# Patient Record
Sex: MALE | Race: ASIAN | NOT HISPANIC OR LATINO | URBAN - METROPOLITAN AREA
[De-identification: names, ages, dates, MRNs, and addresses within clinical notes are randomized per-mention and may not be internally consistent; named-entity substitution may affect disease eponyms.]

---

## 2018-03-08 ENCOUNTER — INPATIENT (INPATIENT)
Facility: HOSPITAL | Age: 53
LOS: 2 days | Discharge: HOME | End: 2018-03-11
Attending: HOSPITALIST | Admitting: HOSPITALIST
Payer: COMMERCIAL

## 2018-03-08 VITALS
TEMPERATURE: 98 F | HEART RATE: 102 BPM | RESPIRATION RATE: 18 BRPM | SYSTOLIC BLOOD PRESSURE: 162 MMHG | DIASTOLIC BLOOD PRESSURE: 95 MMHG | OXYGEN SATURATION: 98 % | WEIGHT: 179.9 LBS

## 2018-03-08 LAB
ALBUMIN SERPL ELPH-MCNC: 4.7 G/DL — SIGNIFICANT CHANGE UP (ref 3.3–5)
ALP SERPL-CCNC: 72 U/L — SIGNIFICANT CHANGE UP (ref 40–120)
ALT FLD-CCNC: 22 U/L — SIGNIFICANT CHANGE UP (ref 4–41)
AST SERPL-CCNC: 26 U/L — SIGNIFICANT CHANGE UP (ref 4–40)
BASOPHILS # BLD AUTO: 0.05 K/UL — SIGNIFICANT CHANGE UP (ref 0–0.2)
BASOPHILS NFR BLD AUTO: 0.6 % — SIGNIFICANT CHANGE UP (ref 0–2)
BILIRUB SERPL-MCNC: 0.5 MG/DL — SIGNIFICANT CHANGE UP (ref 0.2–1.2)
BUN SERPL-MCNC: 20 MG/DL — SIGNIFICANT CHANGE UP (ref 7–23)
CALCIUM SERPL-MCNC: 10.1 MG/DL — SIGNIFICANT CHANGE UP (ref 8.4–10.5)
CHLORIDE SERPL-SCNC: 97 MMOL/L — LOW (ref 98–107)
CK MB BLD-MCNC: 1.69 NG/ML — SIGNIFICANT CHANGE UP (ref 1–6.6)
CK MB BLD-MCNC: SIGNIFICANT CHANGE UP (ref 0–2.5)
CK SERPL-CCNC: 88 U/L — SIGNIFICANT CHANGE UP (ref 30–200)
CO2 SERPL-SCNC: 25 MMOL/L — SIGNIFICANT CHANGE UP (ref 22–31)
CREAT SERPL-MCNC: 1.18 MG/DL — SIGNIFICANT CHANGE UP (ref 0.5–1.3)
EOSINOPHIL # BLD AUTO: 0.12 K/UL — SIGNIFICANT CHANGE UP (ref 0–0.5)
EOSINOPHIL NFR BLD AUTO: 1.4 % — SIGNIFICANT CHANGE UP (ref 0–6)
GLUCOSE SERPL-MCNC: 255 MG/DL — HIGH (ref 70–99)
HCT VFR BLD CALC: 44.3 % — SIGNIFICANT CHANGE UP (ref 39–50)
HGB BLD-MCNC: 15.5 G/DL — SIGNIFICANT CHANGE UP (ref 13–17)
IMM GRANULOCYTES # BLD AUTO: 0.03 # — SIGNIFICANT CHANGE UP
IMM GRANULOCYTES NFR BLD AUTO: 0.3 % — SIGNIFICANT CHANGE UP (ref 0–1.5)
LYMPHOCYTES # BLD AUTO: 1.84 K/UL — SIGNIFICANT CHANGE UP (ref 1–3.3)
LYMPHOCYTES # BLD AUTO: 20.7 % — SIGNIFICANT CHANGE UP (ref 13–44)
MCHC RBC-ENTMCNC: 29.7 PG — SIGNIFICANT CHANGE UP (ref 27–34)
MCHC RBC-ENTMCNC: 35 % — SIGNIFICANT CHANGE UP (ref 32–36)
MCV RBC AUTO: 84.9 FL — SIGNIFICANT CHANGE UP (ref 80–100)
MONOCYTES # BLD AUTO: 0.55 K/UL — SIGNIFICANT CHANGE UP (ref 0–0.9)
MONOCYTES NFR BLD AUTO: 6.2 % — SIGNIFICANT CHANGE UP (ref 2–14)
NEUTROPHILS # BLD AUTO: 6.29 K/UL — SIGNIFICANT CHANGE UP (ref 1.8–7.4)
NEUTROPHILS NFR BLD AUTO: 70.8 % — SIGNIFICANT CHANGE UP (ref 43–77)
NRBC # FLD: 0 — SIGNIFICANT CHANGE UP
PLATELET # BLD AUTO: 247 K/UL — SIGNIFICANT CHANGE UP (ref 150–400)
PMV BLD: 11.1 FL — SIGNIFICANT CHANGE UP (ref 7–13)
POTASSIUM SERPL-MCNC: 4.6 MMOL/L — SIGNIFICANT CHANGE UP (ref 3.5–5.3)
POTASSIUM SERPL-SCNC: 4.6 MMOL/L — SIGNIFICANT CHANGE UP (ref 3.5–5.3)
PROT SERPL-MCNC: 7.6 G/DL — SIGNIFICANT CHANGE UP (ref 6–8.3)
RBC # BLD: 5.22 M/UL — SIGNIFICANT CHANGE UP (ref 4.2–5.8)
RBC # FLD: 12.5 % — SIGNIFICANT CHANGE UP (ref 10.3–14.5)
SODIUM SERPL-SCNC: 138 MMOL/L — SIGNIFICANT CHANGE UP (ref 135–145)
TROPONIN T SERPL-MCNC: < 0.06 NG/ML — SIGNIFICANT CHANGE UP (ref 0–0.06)
WBC # BLD: 8.88 K/UL — SIGNIFICANT CHANGE UP (ref 3.8–10.5)
WBC # FLD AUTO: 8.88 K/UL — SIGNIFICANT CHANGE UP (ref 3.8–10.5)

## 2018-03-08 PROCEDURE — 71046 X-RAY EXAM CHEST 2 VIEWS: CPT | Mod: 26

## 2018-03-08 RX ORDER — ASPIRIN/CALCIUM CARB/MAGNESIUM 324 MG
162 TABLET ORAL ONCE
Qty: 0 | Refills: 0 | Status: COMPLETED | OUTPATIENT
Start: 2018-03-08 | End: 2018-03-08

## 2018-03-08 RX ORDER — ASPIRIN/CALCIUM CARB/MAGNESIUM 324 MG
162 TABLET ORAL ONCE
Qty: 0 | Refills: 0 | Status: DISCONTINUED | OUTPATIENT
Start: 2018-03-08 | End: 2018-03-08

## 2018-03-08 RX ORDER — ASPIRIN/CALCIUM CARB/MAGNESIUM 324 MG
325 TABLET ORAL ONCE
Qty: 0 | Refills: 0 | Status: DISCONTINUED | OUTPATIENT
Start: 2018-03-08 | End: 2018-03-08

## 2018-03-08 RX ORDER — ASPIRIN/CALCIUM CARB/MAGNESIUM 325 MG
192 TABLET ORAL ONCE
Qty: 0 | Refills: 0 | Status: DISCONTINUED | OUTPATIENT
Start: 2018-03-08 | End: 2018-03-08

## 2018-03-08 RX ORDER — ASPIRIN/CALCIUM CARB/MAGNESIUM 324 MG
192 TABLET ORAL ONCE
Qty: 0 | Refills: 0 | Status: DISCONTINUED | OUTPATIENT
Start: 2018-03-08 | End: 2018-03-08

## 2018-03-08 RX ADMIN — Medication 162 MILLIGRAM(S): at 23:39

## 2018-03-08 NOTE — ED ADULT TRIAGE NOTE - CHIEF COMPLAINT QUOTE
Ambulatory with no PMH complaining of intermittent chest pain/cramping that starts on the L side and spreads across to the right chest and BL shoulders. Denies SOB, diaphoresis, N/V/D, travel/long car rides. Comfortable, NAD noted.

## 2018-03-08 NOTE — ED PROVIDER NOTE - ATTENDING CONTRIBUTION TO CARE
I performed a face-to-face evaluation of the patient and performed a history and physical examination. I agree with the history and physical examination.    52 M, DM, resting CP, short-lasting, recurrent, no VTE risks. Appears well. NAD. Afebrile. Vital signs unremarkable. Strong pulse. Respirations unlabored. No LE edema. EKG unremarkable. Low HEART score. Plan: serial trop and EKG. Likely outpatient f/u for stress test.

## 2018-03-08 NOTE — ED PROVIDER NOTE - MEDICAL DECISION MAKING DETAILS
53yo M pmhx DM p/w CC Chest Pain - low heart score - will send for basics, 2 sets troponins, ekg, cxr and likely call PMD (Dr. Rivera) to arrange for f/u stress test if all result come back WNL

## 2018-03-08 NOTE — ED ADULT NURSE NOTE - OBJECTIVE STATEMENT
pt received to room 20. pt is a 52yoM a&ox4 ambulatory at baseline c/o chest pain beginning at 1830 this PM. pt reports pain was 10/10 bilateral cp, took 2 ASA and is now 4/10. pt denies any hx of pain like this. pt reports he was just sitting when pain began. pt reports being tx'ed for flu approx 3 weeks ago. denies sob, diaphoresis,. n/v/d, fever/chills, recent travel/sick contacts. denies cardiac hx. respirations even/unlabored. abdomen soft/nontender/nondistended. skin warm, dry, and intact. 18g iv lock in place to R ac. labs sent. pt nsr on cardiac monitor. will continue to monitor.

## 2018-03-08 NOTE — ED PROVIDER NOTE - OBJECTIVE STATEMENT
51yo M pmhx DM p/w CC chest pain. Pt. states he was sitting down earlier tonight when he started having severe, diffuse, pressure-like chest pain, non-exertional, non-pleuritic. States episodes come and go and last a few minutes each time, currently asymptomatic, never experienced this prior to today. Denies fever chills SOB diaphoresis N/V/D. Non-smoker.

## 2018-03-09 DIAGNOSIS — A41.9 SEPSIS, UNSPECIFIED ORGANISM: ICD-10-CM

## 2018-03-09 LAB
APTT BLD: 26.3 SEC — LOW (ref 27.5–37.4)
CHOLEST SERPL-MCNC: 229 MG/DL — HIGH (ref 120–199)
CK MB BLD-MCNC: 23.32 NG/ML — HIGH (ref 1–6.6)
CK MB BLD-MCNC: 5.7 — HIGH (ref 0–2.5)
CK SERPL-CCNC: 410 U/L — HIGH (ref 30–200)
D DIMER BLD IA.RAPID-MCNC: < 150 NG/ML — SIGNIFICANT CHANGE UP
HBA1C BLD-MCNC: 8.2 % — HIGH (ref 4–5.6)
HDLC SERPL-MCNC: 34 MG/DL — LOW (ref 35–55)
INR BLD: 0.88 — SIGNIFICANT CHANGE UP (ref 0.88–1.17)
LIPID PNL WITH DIRECT LDL SERPL: 111 MG/DL — SIGNIFICANT CHANGE UP
PROTHROM AB SERPL-ACNC: 10.1 SEC — SIGNIFICANT CHANGE UP (ref 9.8–13.1)
TRIGL SERPL-MCNC: 622 MG/DL — HIGH (ref 10–149)
TROPONIN T SERPL-MCNC: 0.24 NG/ML — HIGH (ref 0–0.06)
TROPONIN T SERPL-MCNC: < 0.06 NG/ML — SIGNIFICANT CHANGE UP (ref 0–0.06)

## 2018-03-09 PROCEDURE — 92941 PRQ TRLML REVSC TOT OCCL AMI: CPT | Mod: LD

## 2018-03-09 PROCEDURE — 93010 ELECTROCARDIOGRAM REPORT: CPT

## 2018-03-09 PROCEDURE — 93458 L HRT ARTERY/VENTRICLE ANGIO: CPT | Mod: 26,59

## 2018-03-09 RX ORDER — INSULIN LISPRO 100/ML
VIAL (ML) SUBCUTANEOUS
Qty: 0 | Refills: 0 | Status: DISCONTINUED | OUTPATIENT
Start: 2018-03-09 | End: 2018-03-11

## 2018-03-09 RX ORDER — CLOPIDOGREL BISULFATE 75 MG/1
300 TABLET, FILM COATED ORAL ONCE
Qty: 0 | Refills: 0 | Status: COMPLETED | OUTPATIENT
Start: 2018-03-09 | End: 2018-03-09

## 2018-03-09 RX ORDER — NITROGLYCERIN 6.5 MG
0.4 CAPSULE, EXTENDED RELEASE ORAL ONCE
Qty: 0 | Refills: 0 | Status: COMPLETED | OUTPATIENT
Start: 2018-03-09 | End: 2018-03-09

## 2018-03-09 RX ORDER — TICAGRELOR 90 MG/1
90 TABLET ORAL
Qty: 0 | Refills: 0 | Status: DISCONTINUED | OUTPATIENT
Start: 2018-03-09 | End: 2018-03-11

## 2018-03-09 RX ORDER — DEXTROSE 50 % IN WATER 50 %
25 SYRINGE (ML) INTRAVENOUS ONCE
Qty: 0 | Refills: 0 | Status: DISCONTINUED | OUTPATIENT
Start: 2018-03-09 | End: 2018-03-11

## 2018-03-09 RX ORDER — DEXTROSE 50 % IN WATER 50 %
1 SYRINGE (ML) INTRAVENOUS ONCE
Qty: 0 | Refills: 0 | Status: DISCONTINUED | OUTPATIENT
Start: 2018-03-09 | End: 2018-03-11

## 2018-03-09 RX ORDER — DEXTROSE 50 % IN WATER 50 %
12.5 SYRINGE (ML) INTRAVENOUS ONCE
Qty: 0 | Refills: 0 | Status: DISCONTINUED | OUTPATIENT
Start: 2018-03-09 | End: 2018-03-11

## 2018-03-09 RX ORDER — SODIUM CHLORIDE 9 MG/ML
1000 INJECTION INTRAMUSCULAR; INTRAVENOUS; SUBCUTANEOUS ONCE
Qty: 0 | Refills: 0 | Status: COMPLETED | OUTPATIENT
Start: 2018-03-09 | End: 2018-03-09

## 2018-03-09 RX ORDER — HEPARIN SODIUM 5000 [USP'U]/ML
INJECTION INTRAVENOUS; SUBCUTANEOUS
Qty: 25000 | Refills: 0 | Status: DISCONTINUED | OUTPATIENT
Start: 2018-03-09 | End: 2018-03-09

## 2018-03-09 RX ORDER — GLUCAGON INJECTION, SOLUTION 0.5 MG/.1ML
1 INJECTION, SOLUTION SUBCUTANEOUS ONCE
Qty: 0 | Refills: 0 | Status: DISCONTINUED | OUTPATIENT
Start: 2018-03-09 | End: 2018-03-11

## 2018-03-09 RX ORDER — MORPHINE SULFATE 50 MG/1
4 CAPSULE, EXTENDED RELEASE ORAL ONCE
Qty: 0 | Refills: 0 | Status: DISCONTINUED | OUTPATIENT
Start: 2018-03-09 | End: 2018-03-09

## 2018-03-09 RX ORDER — ATORVASTATIN CALCIUM 80 MG/1
80 TABLET, FILM COATED ORAL AT BEDTIME
Qty: 0 | Refills: 0 | Status: DISCONTINUED | OUTPATIENT
Start: 2018-03-09 | End: 2018-03-11

## 2018-03-09 RX ORDER — ACETAMINOPHEN 500 MG
1000 TABLET ORAL ONCE
Qty: 0 | Refills: 0 | Status: COMPLETED | OUTPATIENT
Start: 2018-03-09 | End: 2018-03-09

## 2018-03-09 RX ORDER — ASPIRIN/CALCIUM CARB/MAGNESIUM 324 MG
81 TABLET ORAL DAILY
Qty: 0 | Refills: 0 | Status: DISCONTINUED | OUTPATIENT
Start: 2018-03-10 | End: 2018-03-11

## 2018-03-09 RX ORDER — HEPARIN SODIUM 5000 [USP'U]/ML
5000 INJECTION INTRAVENOUS; SUBCUTANEOUS ONCE
Qty: 0 | Refills: 0 | Status: COMPLETED | OUTPATIENT
Start: 2018-03-09 | End: 2018-03-09

## 2018-03-09 RX ORDER — HEPARIN SODIUM 5000 [USP'U]/ML
5000 INJECTION INTRAVENOUS; SUBCUTANEOUS EVERY 6 HOURS
Qty: 0 | Refills: 0 | Status: DISCONTINUED | OUTPATIENT
Start: 2018-03-09 | End: 2018-03-09

## 2018-03-09 RX ORDER — INFLUENZA VIRUS VACCINE 15; 15; 15; 15 UG/.5ML; UG/.5ML; UG/.5ML; UG/.5ML
0.5 SUSPENSION INTRAMUSCULAR ONCE
Qty: 0 | Refills: 0 | Status: DISCONTINUED | OUTPATIENT
Start: 2018-03-09 | End: 2018-03-11

## 2018-03-09 RX ORDER — NITROGLYCERIN 6.5 MG
0.4 CAPSULE, EXTENDED RELEASE ORAL
Qty: 0 | Refills: 0 | Status: DISCONTINUED | OUTPATIENT
Start: 2018-03-09 | End: 2018-03-11

## 2018-03-09 RX ORDER — SODIUM CHLORIDE 9 MG/ML
1000 INJECTION, SOLUTION INTRAVENOUS
Qty: 0 | Refills: 0 | Status: DISCONTINUED | OUTPATIENT
Start: 2018-03-09 | End: 2018-03-11

## 2018-03-09 RX ADMIN — CLOPIDOGREL BISULFATE 300 MILLIGRAM(S): 75 TABLET, FILM COATED ORAL at 07:04

## 2018-03-09 RX ADMIN — MORPHINE SULFATE 4 MILLIGRAM(S): 50 CAPSULE, EXTENDED RELEASE ORAL at 06:00

## 2018-03-09 RX ADMIN — HEPARIN SODIUM 5000 UNIT(S): 5000 INJECTION INTRAVENOUS; SUBCUTANEOUS at 07:04

## 2018-03-09 RX ADMIN — Medication 400 MILLIGRAM(S): at 02:47

## 2018-03-09 RX ADMIN — HEPARIN SODIUM 1000 UNIT(S)/HR: 5000 INJECTION INTRAVENOUS; SUBCUTANEOUS at 07:23

## 2018-03-09 RX ADMIN — MORPHINE SULFATE 4 MILLIGRAM(S): 50 CAPSULE, EXTENDED RELEASE ORAL at 05:24

## 2018-03-09 RX ADMIN — Medication 0.4 MILLIGRAM(S): at 06:42

## 2018-03-09 RX ADMIN — Medication 1000 MILLIGRAM(S): at 03:25

## 2018-03-09 RX ADMIN — Medication 30 MILLILITER(S): at 02:47

## 2018-03-09 RX ADMIN — Medication 0.4 MILLIGRAM(S): at 00:45

## 2018-03-09 RX ADMIN — Medication 2: at 18:04

## 2018-03-09 RX ADMIN — TICAGRELOR 90 MILLIGRAM(S): 90 TABLET ORAL at 18:05

## 2018-03-09 RX ADMIN — ATORVASTATIN CALCIUM 80 MILLIGRAM(S): 80 TABLET, FILM COATED ORAL at 21:39

## 2018-03-09 RX ADMIN — Medication 2: at 10:23

## 2018-03-09 RX ADMIN — SODIUM CHLORIDE 1000 MILLILITER(S): 9 INJECTION INTRAMUSCULAR; INTRAVENOUS; SUBCUTANEOUS at 07:00

## 2018-03-09 NOTE — ED CDU PROVIDER INITIAL DAY NOTE - FAMILY HISTORY
Father  Still living? No  Family history of diabetes mellitus, Age at diagnosis: Age Unknown  Family history of heart attack, Age at diagnosis: Age Unknown     Mother  Still living? Unknown  Family history of diabetes mellitus, Age at diagnosis: Age Unknown     Sibling  Still living? No  Family history of heart attack, Age at diagnosis: Age Unknown     Aunt  Still living? No  Family history of heart attack, Age at diagnosis: Age Unknown     Aunt  Still living? No  Family history of heart attack, Age at diagnosis: Age Unknown     Aunt  Still living? No  Family history of heart attack, Age at diagnosis: Age Unknown     Uncle  Still living? No  Family history of heart attack, Age at diagnosis: Age Unknown

## 2018-03-09 NOTE — ED CDU PROVIDER DISPOSITION NOTE - CLINICAL COURSE
pt sent to ED for r/o acs. trops neg but persistent chest pain relieved by nitro. Evaluated by cardiology who recommended cath.

## 2018-03-09 NOTE — ED CDU PROVIDER INITIAL DAY NOTE - OBJECTIVE STATEMENT
51yo M pmhx DM p/w CC chest pain. Pt. states he was sitting down earlier tonight when he started having severe, diffuse, pressure-like chest pain, non-exertional, non-pleuritic. States episodes come and go and last a few minutes each time, currently asymptomatic, never experienced this prior to today. Denies fever chills SOB diaphoresis N/V/D. Non-smoker.  CDU ANAY Mcconnell Note------  51 yo male with PMH of DM, presented to the ED for chest pain; eval'd in ED; trop neg x 2 (2.75 hrs apart); pt continued to have chest pain, so ED team sent pt to CDU for cardiac telemetry monitoring, CE per orders, stress test, observation and reassessment.  On CDU arrival, pt c/o 4/10 central sternal chest pain; non-radiating.  Pt. states pain initially had started with no trigger at rest, states pain was initially sharp and diffuse across chest, radiating to both shoulders and partially down (points) left humeral area (no radiation to back), associated with diaphoresis.  Pt. states pain waxed/waned and finally eased, but as sharper pain eased, was replaced by dull heavy discomfort to central sternal region which has persisted and been constant since.  In ED, pt states pain persisted at 5/10 level; states 4/10 at CDU arrival; pt was given morphine on CDU arrival.    Pt. relates family hx significant for SIX family members with fatal MI's (sister ( at age 51), father ( at age 83), three paternal aunts, and one paternal uncle).  Pt. denies substance abuse hx.  Pt had recent "flu" illness 2 weeks ago.

## 2018-03-09 NOTE — CONSULT NOTE ADULT - SUBJECTIVE AND OBJECTIVE BOX
Date of Admission: 3/9/ 2018    CHIEF COMPLAINT: chest dullness    HISTORY OF PRESENT ILLNESS: 52 [place] citizen male with h/o DM2 and strong FHX for CAD presents with intermittent chest dullness. Patient states that after dinner last night he started to notice L shoulder pain radiating across his chest as a dullness.  Episodes were intermittent and lasted about 5 min. At that time not associated with SOB, diaphoresis, palpitations, or dizziness. After a few hours chest dullness became more persistent with some diaphoresis and patient came to ER. In ER EKG unremarkable and 1st set of cardiac enzymes were negative. Patient was placed in CDU for monitoring with plan for nuclear stress test. Around midnight patient reported severe 10/10 chest dullness with diaphoresis treated with sl NTG and morphine and pain was minimized to 2/10, no further diaphoresis. EKG with minimal ST depressions in inferior leads. 2nd troponin also negative. Dullness persisted and cardiology called for evaluation. DAPT loaded and Heparin gtt started. Nuclear stress test canceled. EKGs and case presented to Dr. Dawn Mendoza and plan for urgent cardiac cath.  Of note 3rd set cardiac enzymes now positive with elevated troponin. Patient agreeable to cath, chest dullness is further diminished, and patient transported to cath lab.      Allergies    No Known Allergies    Intolerances    	    MEDICATIONS:  nitroglycerin     SubLingual 0.4 milliGRAM(s) SubLingual every 5 minutes PRN      dextrose 50% Injectable 12.5 Gram(s) IV Push once  dextrose 50% Injectable 25 Gram(s) IV Push once  dextrose 50% Injectable 25 Gram(s) IV Push once  dextrose Gel 1 Dose(s) Oral once PRN  glucagon  Injectable 1 milliGRAM(s) IntraMuscular once PRN  insulin lispro (HumaLOG) corrective regimen sliding scale   SubCutaneous three times a day before meals    dextrose 5%. 1000 milliLiter(s) IV Continuous <Continuous>      PAST MEDICAL & SURGICAL HISTORY:  DM (diabetes mellitus)  No significant past surgical history      FAMILY HISTORY:  Family history of heart attack (Father, Mother): father  82yo MI  sister  54yo MI      SOCIAL HISTORY:    Smoker    Alcohol  Drugs      REVIEW OF SYSTEMS:  See HPI. Otherwise, 10 point ROS done and otherwise negative.    PHYSICAL EXAM:  T(C): 36.7 (18 @ 06:40), Max: 36.8 (18 @ 21:18)  HR: 84 (18 @ 06:50) (69 - 102)  BP: 109/71 (18 @ 06:50) (109/71 - 189/102)  RR: 18 (18 @ 06:50) (16 - 20)  SpO2: 97% (18 @ 06:50) (97% - 100%)  Wt(kg): --  I&O's Summary      Appearance: Normal	  HEENT:   Normal oral mucosa, PERRL, EOMI	  Cardiovascular: nl S1S2, no M/R/C  Respiratory: CTA B/L	  Psychiatry: A & O x 3, Mood & affect appropriate  Gastrointestinal:  Soft, Non-tender, + BS	  Skin: No rashes, No ecchymoses, No cyanosis	  Neurologic: Non-focal  Extremities: Normal range of motion, No clubbing, cyanosis or edema    LABS:	 	                        15.5   8.88  )-----------( 247      ( 08 Mar 2018 23:00 )             44.3       03-08    138  |  97<L>  |  20  ----------------------------<  255<H>  4.6   |  25  |  1.18    Ca    10.1      08 Mar 2018 23:00    TPro  7.6  /  Alb  4.7  /  TBili  0.5  /  DBili  x   /  AST  26  /  ALT  22  /  AlkPhos  72  03-08      proBNP:   Lipid Profile:   Lipid Profile (18 @ 07:00)    Cholesterol, Serum: 229 mg/dL    Triglycerides, Serum: 622 mg/dL    HDL Cholesterol, Serum: 34 mg/dL    Direct LDL: 111:   RESULT (mg/dL)   (For adults 18 years and older)  ----------------------------------------------------------  Below 70         Ideal for people at very high risk of  heart disease  Below 100        Ideal for people at risk of heart disease  100 - 129        Near [place]  130 - 159        Borderline high  160 - 189        High  190 and above    Very high mg/dL      HgA1c:  Hemoglobin A1C, Whole Blood (03.08.18 @ 04:16)    Hemoglobin A1C, Whole Blood: 8.2: High Risk (prediabetic)    5.7 - 6.4 %  Diabetic, diagnostic           > 6.5 %  ADA diabetic treatment goal    < 7.0 %    HbA1C values may not accurately reflect mean blood glucose  in patients with Hb variants.  Suggest clinical correlation. %       CARDIAC MARKERS:  Troponin T, Serum: < 0.06 ng/mL (18 @ 01:43)  Troponin T, Serum: < 0.06 ng/mL (18 @ 23:00)    CK Total and CKMB (18 @ 23:00)    Creatine Kinase, Serum: 88: CKMB is no longer reflexively performed on elevated CK  results.  To get CKMB results please order "CK AND CKMB".  Effective Preethi 15, 2016. u/L    CKMB: 1.69 ng/mL    CKMB Relative Index: Test not performed    CK Total and CKMB (18 @ 07:00)    Creatine Kinase, Serum: 410: CKMB is no longer reflexively performed on elevated CK  results.  To get CKMB results please order "CK AND CKMB".  Effective Preethi 15, 2016. u/L        CKMB: 1.69 ng/mL ( @ 23:00)    CKMB Relative Index: Test not performed ( @ 23:00)      TELEMETRY: NSR occ VPC  	    EC. NSR NSST TW changes noted. 2. NSR with minimal STD noted in inferior Date of Admission: 3/9/ 2018    CHIEF COMPLAINT: chest dullness    HISTORY OF PRESENT ILLNESS: 52 [place] male with h/o DM2 and strong FHX for CAD presents with intermittent chest dullness. Patient states that after dinner last night he started to notice L shoulder pain radiating across his chest as a dullness.  Episodes were intermittent and lasted about 5 min. At that time not associated with SOB, diaphoresis, palpitations, or dizziness. After a few hours chest dullness became more persistent with some diaphoresis and patient came to ER. In ER EKG unremarkable and 1st set of cardiac enzymes were negative. Patient was placed in CDU for monitoring with plan for nuclear stress test. Around midnight patient reported severe 10/10 chest dullness with diaphoresis treated with sl NTG and morphine and pain was minimized to 2/10, no further diaphoresis. EKG with minimal ST depressions in inferior leads. 2nd troponin also negative. Dullness persisted and cardiology called for evaluation. DAPT loaded and Heparin gtt started. Nuclear stress test canceled. EKGs and case presented to Dr. Dawn Mendoza and plan for urgent cardiac cath.  Of note 3rd set cardiac enzymes now positive with elevated troponin. Patient agreeable to cath, chest dullness is further diminished, and patient transported to cath lab.     906 Addendum: patient reports being treated for flu approximately 3 weeks ago (tamiflu and antibiotics). No additional symptoms since then.     Allergies    No Known Allergies    Intolerances    	    MEDICATIONS:  nitroglycerin     SubLingual 0.4 milliGRAM(s) SubLingual every 5 minutes PRN      dextrose 50% Injectable 12.5 Gram(s) IV Push once  dextrose 50% Injectable 25 Gram(s) IV Push once  dextrose 50% Injectable 25 Gram(s) IV Push once  dextrose Gel 1 Dose(s) Oral once PRN  glucagon  Injectable 1 milliGRAM(s) IntraMuscular once PRN  insulin lispro (HumaLOG) corrective regimen sliding scale   SubCutaneous three times a day before meals    dextrose 5%. 1000 milliLiter(s) IV Continuous <Continuous>      PAST MEDICAL & SURGICAL HISTORY:  DM (diabetes mellitus)  No significant past surgical history      FAMILY HISTORY:  Family history of heart attack (Father, Mother): father  84yo MI  sister  54yo MI      SOCIAL HISTORY:    Smoker    Alcohol  Drugs      REVIEW OF SYSTEMS:  See HPI. Otherwise, 10 point ROS done and otherwise negative.    PHYSICAL EXAM:  T(C): 36.7 (18 @ 06:40), Max: 36.8 (18 @ 21:18)  HR: 84 (18 @ 06:50) (69 - 102)  BP: 109/71 (18 @ 06:50) (109/71 - 189/102)  RR: 18 (18 @ 06:50) (16 - 20)  SpO2: 97% (18 @ 06:50) (97% - 100%)  Wt(kg): --  I&O's Summary      Appearance: Normal	  HEENT:   Normal oral mucosa, PERRL, EOMI	  Cardiovascular: nl S1S2, no M/R/C  Respiratory: CTA B/L	  Psychiatry: A & O x 3, Mood & affect appropriate  Gastrointestinal:  Soft, Non-tender, + BS	  Skin: No rashes, No ecchymoses, No cyanosis	  Neurologic: Non-focal  Extremities: Normal range of motion, No clubbing, cyanosis or edema    LABS:	 	                        15.5   8.88  )-----------( 247      ( 08 Mar 2018 23:00 )             44.3       0308    138  |  97<L>  |  20  ----------------------------<  255<H>  4.6   |  25  |  1.18    Ca    10.1      08 Mar 2018 23:00    TPro  7.6  /  Alb  4.7  /  TBili  0.5  /  DBili  x   /  AST  26  /  ALT  22  /  AlkPhos  72  0308      proBNP:   Lipid Profile:   Lipid Profile (18 @ 07:00)    Cholesterol, Serum: 229 mg/dL    Triglycerides, Serum: 622 mg/dL    HDL Cholesterol, Serum: 34 mg/dL    Direct LDL: 111:   RESULT (mg/dL)   (For adults 18 years and older)  ----------------------------------------------------------  Below 70         Ideal for people at very high risk of  heart disease  Below 100        Ideal for people at risk of heart disease  100 - 129        [place]  130 - 159        Borderline high  160 - 189        High  190 and above    Very high mg/dL      HgA1c:  Hemoglobin A1C, Whole Blood (18 @ 04:16)    Hemoglobin A1C, Whole Blood: 8.2: High Risk (prediabetic)    5.7 - 6.4 %  Diabetic, diagnostic           > 6.5 %  ADA diabetic treatment goal    < 7.0 %    HbA1C values may not accurately reflect mean blood glucose  in patients with Hb variants.  Suggest clinical correlation. %       CARDIAC MARKERS:  Troponin T, Serum: < 0.06 ng/mL (18 @ 01:43)  Troponin T, Serum: < 0.06 ng/mL (18 @ 23:00)    CK Total and CKMB (18 @ 23:00)    Creatine Kinase, Serum: 88: CKMB is no longer reflexively performed on elevated CK  results.  To get CKMB results please order "CK AND CKMB".  Effective Preethi 15, 2016. u/L    CKMB: 1.69 ng/mL    CKMB Relative Index: Test not performed    CK Total and CKMB (18 @ 07:00)    Creatine Kinase, Serum: 410: CKMB is no longer reflexively performed on elevated CK  results.  To get CKMB results please order "CK AND CKMB".  Effective Preethi 15, 2016. u/L        CKMB: 1.69 ng/mL ( @ 23:00)    CKMB Relative Index: Test not performed ( @ 23:00)      TELEMETRY: NSR occ VPC  	    EC. NSR NSST TW changes noted. 2. NSR with minimal STD noted in inferior

## 2018-03-09 NOTE — ED CDU PROVIDER INITIAL DAY NOTE - DETAILS
Initial CDU plan: Cardiac telemetry monitoring, CE per orders, stress test, observation and reassessment.  Plan changed to cardiac tele monitoring, CE per orders/d-dimer/echo, stress test cancelled (pt having persisting pain), will need likely cath, meds ordered (nitro, plavix load, heparin); cardiology cath consult called.

## 2018-03-09 NOTE — H&P CARDIOLOGY - HISTORY OF PRESENT ILLNESS
53 year old male with family history of CAD with DM-II who presented to [facility] ED last night complaining of chest pain that occured after eating dinner. Patient persisted and increasing in intensity and prompted him to present to the ED. Admits to first episode of chest pain. Denies SOB, dizziness, syncope, edema, orthopnea, PND, increase in fatigue and decrease in exercise tolerance.  Patient R/O MI with EKg changes supicious for ischemia and given Plavix 300mg po x1 and ASA 162mg po x1 along with heparin drip.   In light of patients cardiac risk factors, symptoms and abnormal noninvasive test findings there is high suspicion for CAD. Patient is now referred to [facility] for a cardiac catheterization with possible PTCA/stent.

## 2018-03-09 NOTE — ED CDU PROVIDER INITIAL DAY NOTE - INDICATION FOR OBSERVATION
Initial CDU plan: Cardiac telemetry monitoring, CE per orders, stress test, observation and reassessment./Diagnostic Uncertainty

## 2018-03-09 NOTE — ED ADULT NURSE REASSESSMENT NOTE - NS ED NURSE REASSESS COMMENT FT1
pt began c/o severe 10/10 midsternal cp. repeat EKG done. BPs checked both arms: L: 192/102 R: 189/102. Pt diaphoretic. MD made aware. MD at bedside for eval. pt NSR on cardiac monitor. will reassess.

## 2018-03-09 NOTE — CHART NOTE - NSCHARTNOTEFT_GEN_A_CORE
Right wrist with guaze and tape over access site from cath. No swelling or bleeding, capillary refill < 2sec.

## 2018-03-09 NOTE — CONSULT NOTE ADULT - ASSESSMENT
52M with DM and strong FHX for CAD presents with chest dullness, now with elevated cardiac enzymes and concern for unstable angina vs. NSTEMI.    Case and EKGs presented to Dr. Dawn Mendoza: DAPT load with Heparin gtt and plan for urgent cath.

## 2018-03-09 NOTE — ED CDU PROVIDER INITIAL DAY NOTE - PROGRESS NOTE DETAILS
On my arrival in CDU, pt was being evaluated. by cardiology for cath. he appeared stable. was taken by cardiology to cath. On CDU arrival, pt having what he describes as constant central sternal chest pain.  Pt rec'd morphine on CDU arrival; did not improve 4/10 pain.  EKG initially performed in ED 3/8 at 21:32 hrs showed NSR with ST segment depression in lead III; EKG performed in ED 3/9 @ 00:23 hrs showed deeper ST depressions in lead III and new ST depressions in lead AVF; troponin x 2 negative (trops were approx. 2.75 hrs apart).  NTG dose given; improved pain from 4/10 to 2/10; additional NTG dose given, pain unchanged at 2/10; BP dropped to 90/54; NS 1 liter given which helped BP; pt objectively appeared comfortable throughout.  EKG performed in ED 3/8 at 21:32 hrs showed NSR with ST segment depression in lead III; EKG from 3/9 @ 00:23 hrs showed deeper ST depressions in lead III and new ST depressions in lead AVF; troponin x 2 negative (trops were approx. 2.75 hrs apart).  After NTG dose given in CDU, repeat EKG performed which showed slight improvement of ST depressions from prior EKG.  C/D with stress PA at 06:17 hrs who came to eval pt as well; mutually decided to cancel stress test.  Cardiology contacted (spoke to covering NP) at 0645 hrs for cath consult; promptly came to eval pt.  Pt. was signed over to day CDU PA and MD at 0715 hrs; cards NP discussed with fellow and advised admit to Dr. Dawn Mendoza; day team in process of admitting pt who is going to go for prompt cath.  No other issues in interim; pt stable.

## 2018-03-09 NOTE — ED CDU PROVIDER INITIAL DAY NOTE - MEDICAL DECISION MAKING DETAILS
Cardiac telemetry monitoring, CE per orders, stress test, observation and reassessment. Initial CDU plan: Cardiac telemetry monitoring, CE per orders, stress test, observation and reassessment.  Plan changed to cardiac tele monitoring, CE per orders/d-dimer/echo, stress test cancelled (pt having persisting pain), will need likely cath, meds ordered (nitro, plavix load, heparin); cardiology cath consult called.

## 2018-03-09 NOTE — H&P CARDIOLOGY - FAMILY HISTORY
Father  Still living? No  Family history of heart attack, Age at diagnosis: Age Unknown     Mother  Still living? Unknown  Family history of heart attack, Age at diagnosis: Age Unknown

## 2018-03-09 NOTE — ED CDU PROVIDER INITIAL DAY NOTE - ATTENDING CONTRIBUTION TO CARE
DR. PINA, ATTENDING MD-  I performed a face to face bedside interview with patient regarding history of present illness, review of symptoms and past medical history. I completed an independent physical exam.  I have discussed patient's plan of care with PA.   Documentation as above in the note.

## 2018-03-10 LAB
BUN SERPL-MCNC: 12 MG/DL — SIGNIFICANT CHANGE UP (ref 7–23)
CALCIUM SERPL-MCNC: 9.3 MG/DL — SIGNIFICANT CHANGE UP (ref 8.4–10.5)
CHLORIDE SERPL-SCNC: 97 MMOL/L — LOW (ref 98–107)
CK MB BLD-MCNC: 10.31 NG/ML — HIGH (ref 1–6.6)
CK MB BLD-MCNC: 26.71 NG/ML — HIGH (ref 1–6.6)
CK MB BLD-MCNC: 3.8 — HIGH (ref 0–2.5)
CK MB BLD-MCNC: 6.3 — HIGH (ref 0–2.5)
CK SERPL-CCNC: 271 U/L — HIGH (ref 30–200)
CK SERPL-CCNC: 422 U/L — HIGH (ref 30–200)
CO2 SERPL-SCNC: 24 MMOL/L — SIGNIFICANT CHANGE UP (ref 22–31)
CREAT SERPL-MCNC: 0.94 MG/DL — SIGNIFICANT CHANGE UP (ref 0.5–1.3)
GLUCOSE SERPL-MCNC: 246 MG/DL — HIGH (ref 70–99)
HCT VFR BLD CALC: 45.9 % — SIGNIFICANT CHANGE UP (ref 39–50)
HGB BLD-MCNC: 15.3 G/DL — SIGNIFICANT CHANGE UP (ref 13–17)
MAGNESIUM SERPL-MCNC: 3.3 MG/DL — HIGH (ref 1.6–2.6)
MCHC RBC-ENTMCNC: 29 PG — SIGNIFICANT CHANGE UP (ref 27–34)
MCHC RBC-ENTMCNC: 33.3 % — SIGNIFICANT CHANGE UP (ref 32–36)
MCV RBC AUTO: 86.9 FL — SIGNIFICANT CHANGE UP (ref 80–100)
NRBC # FLD: 0 — SIGNIFICANT CHANGE UP
PLATELET # BLD AUTO: 220 K/UL — SIGNIFICANT CHANGE UP (ref 150–400)
PMV BLD: 11 FL — SIGNIFICANT CHANGE UP (ref 7–13)
POTASSIUM SERPL-MCNC: 4.2 MMOL/L — SIGNIFICANT CHANGE UP (ref 3.5–5.3)
POTASSIUM SERPL-SCNC: 4.2 MMOL/L — SIGNIFICANT CHANGE UP (ref 3.5–5.3)
RBC # BLD: 5.28 M/UL — SIGNIFICANT CHANGE UP (ref 4.2–5.8)
RBC # FLD: 12.7 % — SIGNIFICANT CHANGE UP (ref 10.3–14.5)
SODIUM SERPL-SCNC: 136 MMOL/L — SIGNIFICANT CHANGE UP (ref 135–145)
TROPONIN T SERPL-MCNC: 1.76 NG/ML — HIGH (ref 0–0.06)
TROPONIN T SERPL-MCNC: 2.04 NG/ML — HIGH (ref 0–0.06)
WBC # BLD: 9.99 K/UL — SIGNIFICANT CHANGE UP (ref 3.8–10.5)
WBC # FLD AUTO: 9.99 K/UL — SIGNIFICANT CHANGE UP (ref 3.8–10.5)

## 2018-03-10 RX ORDER — METOPROLOL TARTRATE 50 MG
25 TABLET ORAL
Qty: 0 | Refills: 0 | Status: DISCONTINUED | OUTPATIENT
Start: 2018-03-10 | End: 2018-03-11

## 2018-03-10 RX ORDER — HEPARIN SODIUM 5000 [USP'U]/ML
5000 INJECTION INTRAVENOUS; SUBCUTANEOUS EVERY 8 HOURS
Qty: 0 | Refills: 0 | Status: DISCONTINUED | OUTPATIENT
Start: 2018-03-10 | End: 2018-03-11

## 2018-03-10 RX ADMIN — Medication 81 MILLIGRAM(S): at 11:58

## 2018-03-10 RX ADMIN — Medication 2: at 08:58

## 2018-03-10 RX ADMIN — HEPARIN SODIUM 5000 UNIT(S): 5000 INJECTION INTRAVENOUS; SUBCUTANEOUS at 22:03

## 2018-03-10 RX ADMIN — Medication 25 MILLIGRAM(S): at 14:57

## 2018-03-10 RX ADMIN — Medication 2: at 13:24

## 2018-03-10 RX ADMIN — HEPARIN SODIUM 5000 UNIT(S): 5000 INJECTION INTRAVENOUS; SUBCUTANEOUS at 14:57

## 2018-03-10 RX ADMIN — TICAGRELOR 90 MILLIGRAM(S): 90 TABLET ORAL at 05:16

## 2018-03-10 RX ADMIN — ATORVASTATIN CALCIUM 80 MILLIGRAM(S): 80 TABLET, FILM COATED ORAL at 22:03

## 2018-03-10 RX ADMIN — Medication 1: at 17:33

## 2018-03-10 RX ADMIN — TICAGRELOR 90 MILLIGRAM(S): 90 TABLET ORAL at 17:33

## 2018-03-10 NOTE — PROGRESS NOTE ADULT - ASSESSMENT
52M with DM and strong FHX for CAD presents NSTEMI s/p PCI D1    NSTEMI    - Continue DAPT, statin   - start lopressor 25mg BID  - Add ACE-i in the AM  - Trend CE   - TTE in the AM and d/c home

## 2018-03-10 NOTE — PROGRESS NOTE ADULT - ATTENDING COMMENTS
d/w LOU Hughes. Agree with above. Plan for d/c tomorrow, pending echo and resolution of tachycardia.

## 2018-03-10 NOTE — PROGRESS NOTE ADULT - SUBJECTIVE AND OBJECTIVE BOX
Tele: NSR     Overnight: No compliants    MEDICATIONS  (STANDING):  aspirin enteric coated 81 milliGRAM(s) Oral daily  atorvastatin 80 milliGRAM(s) Oral at bedtime  influenza   Vaccine 0.5 milliLiter(s) IntraMuscular once  insulin lispro (HumaLOG) corrective regimen sliding scale   SubCutaneous three times a day before meals  metoprolol     tartrate 25 milliGRAM(s) Oral two times a day  ticagrelor 90 milliGRAM(s) Oral two times a day    MEDICATIONS  (PRN):  dextrose Gel 1 Dose(s) Oral once PRN Blood Glucose LESS THAN 70 milliGRAM(s)/deciliter  glucagon  Injectable 1 milliGRAM(s) IntraMuscular once PRN Glucose LESS THAN 70 milligrams/deciliter  nitroglycerin     SubLingual 0.4 milliGRAM(s) SubLingual every 5 minutes PRN Chest Pain          Vital Signs Last 24 Hrs  T(C): 37.3 (10 Mar 2018 08:30), Max: 37.3 (10 Mar 2018 08:30)  T(F): 99.1 (10 Mar 2018 08:30), Max: 99.1 (10 Mar 2018 08:30)  HR: 130 (10 Mar 2018 13:05) (80 - 132)  BP: 122/86 (10 Mar 2018 08:30) (122/86 - 135/85)  RR: 18 (10 Mar 2018 08:30) (18 - 18)  SpO2: 100% (10 Mar 2018 08:30) (98% - 100%)  I&O's Detail        Physical exam:   Gen- NAD  Resp- Clear   CV- S1S2 RRR  ABD- +BS x 4 ND/NT  EXT- No edema   Neuro- A&Ox3                            15.3   9.99  )-----------( 220      ( 10 Mar 2018 07:10 )             45.9     PT/INR - ( 09 Mar 2018 07:00 )   PT: 10.1 SEC;   INR: 0.88          PTT - ( 09 Mar 2018 07:00 )  PTT:26.3 SEC  10 Mar 2018 07:10    136    |  97<L>  |  12     ----------------------------<  246<H>  4.2     |  24     |  0.94   08 Mar 2018 23:00    138    |  97<L>  |  20     ----------------------------<  255<H>  4.6     |  25     |  1.18     Ca    9.3        10 Mar 2018 07:10  Ca    10.1       08 Mar 2018 23:00  Mg     3.3<H>     10 Mar 2018 07:10    TPro  7.6    /  Alb  4.7    /  TBili  0.5    /  DBili  x      /  AST  26     /  ALT  22     /  AlkPhos  72     08 Mar 2018 23:00    CARDIAC MARKERS     reviewed

## 2018-03-11 ENCOUNTER — TRANSCRIPTION ENCOUNTER (OUTPATIENT)
Age: 53
End: 2018-03-11

## 2018-03-11 VITALS
DIASTOLIC BLOOD PRESSURE: 72 MMHG | TEMPERATURE: 98 F | OXYGEN SATURATION: 96 % | SYSTOLIC BLOOD PRESSURE: 106 MMHG | HEART RATE: 86 BPM | RESPIRATION RATE: 19 BRPM

## 2018-03-11 LAB
BUN SERPL-MCNC: 15 MG/DL — SIGNIFICANT CHANGE UP (ref 7–23)
CALCIUM SERPL-MCNC: 9.3 MG/DL — SIGNIFICANT CHANGE UP (ref 8.4–10.5)
CHLORIDE SERPL-SCNC: 97 MMOL/L — LOW (ref 98–107)
CO2 SERPL-SCNC: 26 MMOL/L — SIGNIFICANT CHANGE UP (ref 22–31)
CREAT SERPL-MCNC: 0.97 MG/DL — SIGNIFICANT CHANGE UP (ref 0.5–1.3)
GLUCOSE SERPL-MCNC: 216 MG/DL — HIGH (ref 70–99)
HCT VFR BLD CALC: 43.7 % — SIGNIFICANT CHANGE UP (ref 39–50)
HGB BLD-MCNC: 15 G/DL — SIGNIFICANT CHANGE UP (ref 13–17)
MCHC RBC-ENTMCNC: 29.5 PG — SIGNIFICANT CHANGE UP (ref 27–34)
MCHC RBC-ENTMCNC: 34.3 % — SIGNIFICANT CHANGE UP (ref 32–36)
MCV RBC AUTO: 86 FL — SIGNIFICANT CHANGE UP (ref 80–100)
NRBC # FLD: 0 — SIGNIFICANT CHANGE UP
PLATELET # BLD AUTO: 214 K/UL — SIGNIFICANT CHANGE UP (ref 150–400)
PMV BLD: 11.3 FL — SIGNIFICANT CHANGE UP (ref 7–13)
POTASSIUM SERPL-MCNC: 3.9 MMOL/L — SIGNIFICANT CHANGE UP (ref 3.5–5.3)
POTASSIUM SERPL-SCNC: 3.9 MMOL/L — SIGNIFICANT CHANGE UP (ref 3.5–5.3)
RBC # BLD: 5.08 M/UL — SIGNIFICANT CHANGE UP (ref 4.2–5.8)
RBC # FLD: 12.5 % — SIGNIFICANT CHANGE UP (ref 10.3–14.5)
SODIUM SERPL-SCNC: 137 MMOL/L — SIGNIFICANT CHANGE UP (ref 135–145)
TROPONIN T SERPL-MCNC: 1.85 NG/ML — HIGH (ref 0–0.06)
WBC # BLD: 8.55 K/UL — SIGNIFICANT CHANGE UP (ref 3.8–10.5)
WBC # FLD AUTO: 8.55 K/UL — SIGNIFICANT CHANGE UP (ref 3.8–10.5)

## 2018-03-11 PROCEDURE — 93306 TTE W/DOPPLER COMPLETE: CPT | Mod: 26

## 2018-03-11 RX ORDER — METOPROLOL TARTRATE 50 MG
1 TABLET ORAL
Qty: 60 | Refills: 0 | OUTPATIENT
Start: 2018-03-11 | End: 2018-04-09

## 2018-03-11 RX ORDER — ATORVASTATIN CALCIUM 80 MG/1
1 TABLET, FILM COATED ORAL
Qty: 30 | Refills: 0 | OUTPATIENT
Start: 2018-03-11 | End: 2018-04-09

## 2018-03-11 RX ORDER — ASPIRIN/CALCIUM CARB/MAGNESIUM 324 MG
1 TABLET ORAL
Qty: 0 | Refills: 0 | COMMUNITY
Start: 2018-03-11

## 2018-03-11 RX ORDER — LISINOPRIL 2.5 MG/1
1 TABLET ORAL
Qty: 30 | Refills: 0 | OUTPATIENT
Start: 2018-03-11 | End: 2018-04-09

## 2018-03-11 RX ORDER — TICAGRELOR 90 MG/1
1 TABLET ORAL
Qty: 180 | Refills: 0 | OUTPATIENT
Start: 2018-03-11 | End: 2018-06-08

## 2018-03-11 RX ORDER — METOPROLOL TARTRATE 50 MG
50 TABLET ORAL DAILY
Qty: 0 | Refills: 0 | Status: DISCONTINUED | OUTPATIENT
Start: 2018-03-11 | End: 2018-03-11

## 2018-03-11 RX ORDER — LISINOPRIL 2.5 MG/1
5 TABLET ORAL DAILY
Qty: 0 | Refills: 0 | Status: DISCONTINUED | OUTPATIENT
Start: 2018-03-11 | End: 2018-03-11

## 2018-03-11 RX ORDER — METFORMIN HYDROCHLORIDE 850 MG/1
1 TABLET ORAL
Qty: 0 | Refills: 0 | COMMUNITY

## 2018-03-11 RX ORDER — METOPROLOL TARTRATE 50 MG
1 TABLET ORAL
Qty: 30 | Refills: 0 | OUTPATIENT
Start: 2018-03-11 | End: 2018-04-09

## 2018-03-11 RX ADMIN — TICAGRELOR 90 MILLIGRAM(S): 90 TABLET ORAL at 17:48

## 2018-03-11 RX ADMIN — Medication 1: at 17:48

## 2018-03-11 RX ADMIN — Medication 25 MILLIGRAM(S): at 05:17

## 2018-03-11 RX ADMIN — Medication 1: at 09:30

## 2018-03-11 RX ADMIN — Medication 81 MILLIGRAM(S): at 11:47

## 2018-03-11 RX ADMIN — TICAGRELOR 90 MILLIGRAM(S): 90 TABLET ORAL at 05:16

## 2018-03-11 RX ADMIN — HEPARIN SODIUM 5000 UNIT(S): 5000 INJECTION INTRAVENOUS; SUBCUTANEOUS at 05:17

## 2018-03-11 RX ADMIN — HEPARIN SODIUM 5000 UNIT(S): 5000 INJECTION INTRAVENOUS; SUBCUTANEOUS at 15:48

## 2018-03-11 RX ADMIN — Medication 1: at 13:30

## 2018-03-11 NOTE — DISCHARGE NOTE ADULT - PATIENT PORTAL LINK FT
You can access the [facility] Patient Portal, offered by [facility], by registering with the following website: [email or web address]

## 2018-03-11 NOTE — DISCHARGE NOTE ADULT - CARE PLAN
Principal Discharge DX:	NSTEMI (non-ST elevated myocardial infarction)  Goal:	To be asymptomatic, to reduce risks factors such as hypertension, diabetes and hyperlipidemia to lower the risk of blood clots formation; and to prevent complications of coronary artery disease such as worsening chest pain, heart attack and death.  Assessment and plan of treatment:	Continue aspirin and Plavix, do not stop unless instructed by your physician.  Continue low salt, fat, cholesterol and carbohydrate diet. Follow up with cardiologist and primary care physician's routine appointment.  Secondary Diagnosis:	CAD (coronary artery disease)  Goal:	To be asymptomatic, to reduce risks factors such as hypertension, diabetes and hyperlipidemia to lower the risk of blood clots formation; and to prevent complications of coronary artery disease such as worsening chest pain, heart attack and death.  Assessment and plan of treatment:	Continue aspirin and Plavix, do not stop unless instructed by your physician.  Continue low salt, fat, cholesterol and carbohydrate diet. Follow up with cardiologist and primary care physician's routine appointment.  Secondary Diagnosis:	DM (diabetes mellitus)  Goal:	To maintain a normal blood glucose level and HgA1C level < 5.7 and to prevent diabetic complications such as uncontrolled diabetes, diabetic coma, blindness, renal failure, and amputations.  Assessment and plan of treatment:	Monitor finger sticks pre-meal and bedtime, low salt, fat and carbohydrate diet, minimize glucose intake.  Exercise daily for at least 30 minutes and weight loss.  Follow up with primary care physician and endocrinologist for routine Hemoglobin A1C checks and management.  Follow up with your ophthalmologist for routine yearly vision exams.

## 2018-03-11 NOTE — PROGRESS NOTE ADULT - SUBJECTIVE AND OBJECTIVE BOX
Darryl Jones M.D.  Pager: [phone]  Off hours and weekends call -  NS: [id number]    [id number]    Overnight events: No acute Overnight Events.     Review Of Systems: Denies SOB/BROOKS/CP/Fevers/Chills/Nausea/Vomiting/Diarrhea    Medications:  aspirin enteric coated 81 milliGRAM(s) Oral daily  atorvastatin 80 milliGRAM(s) Oral at bedtime  dextrose 5%. 1000 milliLiter(s) IV Continuous <Continuous>  dextrose 50% Injectable 12.5 Gram(s) IV Push once  dextrose 50% Injectable 25 Gram(s) IV Push once  dextrose 50% Injectable 25 Gram(s) IV Push once  dextrose Gel 1 Dose(s) Oral once PRN  glucagon  Injectable 1 milliGRAM(s) IntraMuscular once PRN  heparin  Injectable 5000 Unit(s) SubCutaneous every 8 hours  influenza   Vaccine 0.5 milliLiter(s) IntraMuscular once  insulin lispro (HumaLOG) corrective regimen sliding scale   SubCutaneous three times a day before meals  metoprolol     tartrate 25 milliGRAM(s) Oral two times a day  nitroglycerin     SubLingual 0.4 milliGRAM(s) SubLingual every 5 minutes PRN  ticagrelor 90 milliGRAM(s) Oral two times a day    PMH/PSH/FH/SH: Unchanged  Vital Signs Last 24 Hrs  T(C): 37.2 (11 Mar 2018 05:00), Max: 37.3 (10 Mar 2018 14:55)  T(F): 98.9 (11 Mar 2018 05:00), Max: 99.2 (10 Mar 2018 14:55)  HR: 89 (11 Mar 2018 05:00) (89 - 130)  BP: 119/65 (11 Mar 2018 05:00) (113/60 - 129/65)  BP(mean): --  RR: 18 (11 Mar 2018 05:00) (18 - 18)  SpO2: 100% (11 Mar 2018 05:00) (97% - 100%)Daily   I&O's Summary      Physical Exam:  Appearance:  NAD  Eyes: PERRL, EOMI  HENT: NC/AT  Cardiovascular: S1, S2, RRR, No m/r/g appreciated, No edema, no elevation in JVP  Respiratory: Clear to auscultation bilaterally  Gastrointestinal: Soft, Non-tender, Non-distended, BS+  Musculoskeletal:  No clubbing, No joint deformity   Neurologic: Grossly Normal  Lymphatic: No lymphadenopathy  Psychiatry: AAOx3, Mood & affect appropriate  Skin: No rashes, No ecchymoses, No cyanosis    Labs:                        15.0   8.55  )-----------( 214      ( 11 Mar 2018 06:08 )             43.7     03-11    137  |  97<L>  |  15  ----------------------------<  216<H>  3.9   |  26  |  0.97    Ca    9.3      11 Mar 2018 06:08  Mg     3.3     03-10      PTT:   INR:  CARDIAC MARKERS ( 11 Mar 2018 06:08 )  x     / 1.85 ng/mL / x     / x     / x      CARDIAC MARKERS ( 10 Mar 2018 18:05 )  x     / 1.76 ng/mL / 271 u/L / 10.31 ng/mL / x      CARDIAC MARKERS ( 10 Mar 2018 07:10 )  x     / 2.04 ng/mL / 422 u/L / 26.71 ng/mL / x                Echo: Pending    Cath:  < from: Cardiac Cath Lab - Adult (03.09.18 @ 08:38) >  VENTRICLES: EF estimated was 65 %.  CORONARY VESSELS: The coronary circulation is right dominant.  LM:   --  LM: Normal.  LAD:   --  Proximal LAD: There was a diffuse 30 % stenosis at a site with  no prior intervention. The lesion was eccentric. There was BOBBY grade 3  flow through the vessel (brisk flow).  --  D1: There was a diffuse 100 % stenosis at a site with no prior  intervention. The lesion was complex. There was BOBBY grade 0 flow through  the vessel (no flow) and a moderate-sized vascular territory distal to the  lesion. This is a likely culprit for the patient's clinical presentation.  An intervention was performed.  --  D2: Angiography showed minor luminal irregularities with no flow  limiting lesions.  --  D3: Angiography showed mild atherosclerosis with no flow limiting  lesions.  CX:   --  Circumflex: Angiography showed mild atherosclerosis with no flow  limiting lesions.  RCA:   --  RCA: Angiography showed minor luminal irregularities with no  flow limiting lesions.  COMPLICATIONS: No complications occurred during the cath lab visit.  DIAGNOSTIC IMPRESSIONS: Successful PCI to 100% occlusion of D-1 using  2.25mm Andre JORDAN  DIAGNOSTIC RECOMMENDATIONS: DAPT x 12 months  Aggressive risk factor modification  INTERVENTIONAL IMPRESSIONS: Successful PCI to 100% occlusion of D-1 using  2.25mm Andre JORDAN  INTERVENTIONAL RECOMMENDATIONS: DAPT x 12 months  Aggressive risk factor modification  Prepared and signed by  Dawn Mendoza M.D.    < end of copied text >        Interpretation of Telemetry: Telemetry reviewed, NSR, no acute overnight events.

## 2018-03-11 NOTE — DISCHARGE NOTE ADULT - PLAN OF CARE
To be asymptomatic, to reduce risks factors such as hypertension, diabetes and hyperlipidemia to lower the risk of blood clots formation; and to prevent complications of coronary artery disease such as worsening chest pain, heart attack and death. Continue aspirin and Plavix, do not stop unless instructed by your physician.  Continue low salt, fat, cholesterol and carbohydrate diet. Follow up with cardiologist and primary care physician's routine appointment. To maintain a normal blood glucose level and HgA1C level < 5.7 and to prevent diabetic complications such as uncontrolled diabetes, diabetic coma, blindness, renal failure, and amputations. Monitor finger sticks pre-meal and bedtime, low salt, fat and carbohydrate diet, minimize glucose intake.  Exercise daily for at least 30 minutes and weight loss.  Follow up with primary care physician and endocrinologist for routine Hemoglobin A1C checks and management.  Follow up with your ophthalmologist for routine yearly vision exams.

## 2018-03-11 NOTE — DISCHARGE NOTE ADULT - MEDICATION SUMMARY - MEDICATIONS TO TAKE
I will START or STAY ON the medications listed below when I get home from the hospital:    aspirin 81 mg oral delayed release tablet  -- 1 tab(s) by mouth once a day  -- Indication: For  MI/CAD    lisinopril 5 mg oral tablet  -- 1 tab(s) by mouth once a day  -- Indication: For CAD    metFORMIN 500 mg oral tablet  -- 1 tab(s) by mouth 2 times a day ( Do Not Restart until 3/13  -- Indication: For DM (diabetes mellitus)    atorvastatin 80 mg oral tablet  -- 1 tab(s) by mouth once a day (at bedtime)  -- Indication: For Cholesterol    ticagrelor 90 mg oral tablet  -- 1 tab(s) by mouth 2 times a day  -- Indication: For CAD    metoprolol succinate 50 mg oral tablet, extended release  -- 1 tab(s) by mouth once a day  -- Indication: For CAD

## 2018-03-11 NOTE — DISCHARGE NOTE ADULT - HOSPITAL COURSE
52 year old male with family history of CAD with DM-II who presented to [facility] ED last night complaining of chest pain. R/I NSTEMI with 3rd troponin positive s/p cath with stent to shae   Case discussed with attending, Pt is stable for Discharge Home.

## 2018-03-11 NOTE — DISCHARGE NOTE ADULT - CARE PROVIDER_API CALL
Dawn Mendoza ([facility]), Cardiology; Internal Medicine  [id number] [place] [address]  [address]  [place], [place] [id number]  Phone: [phone]  Fax: [phone]

## 2018-03-11 NOTE — PROGRESS NOTE ADULT - ASSESSMENT
52M PMHx DM2 a/w NSTEMI now s/p PCI to D1    - ASA/Ticagrelor for 12 months  - Change metoprolol tartrate to Toprol Xl 50mg QD  - c/w statin  - add lisinopril 5mg QD  - d/c planning with close cardiology follow up.

## 2018-03-28 PROBLEM — E11.9 TYPE 2 DIABETES MELLITUS WITHOUT COMPLICATIONS: Chronic | Status: ACTIVE | Noted: 2018-03-08

## 2018-04-02 PROBLEM — Z00.00 ENCOUNTER FOR PREVENTIVE HEALTH EXAMINATION: Status: ACTIVE | Noted: 2018-04-02

## 2018-04-10 ENCOUNTER — RECORD ABSTRACTING (OUTPATIENT)
Age: 53
End: 2018-04-10

## 2018-04-10 DIAGNOSIS — E11.9 TYPE 2 DIABETES MELLITUS W/OUT COMPLICATIONS: ICD-10-CM

## 2018-04-10 DIAGNOSIS — I25.2 OLD MYOCARDIAL INFARCTION: ICD-10-CM

## 2018-04-10 DIAGNOSIS — R07.9 CHEST PAIN, UNSPECIFIED: ICD-10-CM

## 2018-04-10 DIAGNOSIS — Z98.62 PERIPHERAL VASCULAR ANGIOPLASTY STATUS: ICD-10-CM

## 2018-04-10 DIAGNOSIS — I25.10 ATHEROSCLEROTIC HEART DISEASE OF NATIVE CORONARY ARTERY W/OUT ANGINA PECTORIS: ICD-10-CM

## 2018-04-10 RX ORDER — METFORMIN HYDROCHLORIDE 500 MG/1
500 TABLET, COATED ORAL TWICE DAILY
Refills: 0 | Status: ACTIVE | COMMUNITY

## 2018-04-10 RX ORDER — METOPROLOL SUCCINATE 50 MG/1
50 TABLET, EXTENDED RELEASE ORAL DAILY
Refills: 0 | Status: ACTIVE | COMMUNITY

## 2018-04-10 RX ORDER — LISINOPRIL 5 MG/1
5 TABLET ORAL DAILY
Refills: 0 | Status: ACTIVE | COMMUNITY

## 2018-04-10 RX ORDER — TICAGRELOR 90 MG/1
90 TABLET ORAL TWICE DAILY
Refills: 0 | Status: ACTIVE | COMMUNITY

## 2018-04-10 RX ORDER — ATORVASTATIN CALCIUM 80 MG/1
80 TABLET, FILM COATED ORAL DAILY
Refills: 0 | Status: ACTIVE | COMMUNITY

## 2018-04-10 RX ORDER — ASPIRIN ENTERIC COATED TABLETS 81 MG 81 MG/1
81 TABLET, DELAYED RELEASE ORAL DAILY
Refills: 0 | Status: ACTIVE | COMMUNITY

## 2018-04-11 ENCOUNTER — TRANSCRIPTION ENCOUNTER (OUTPATIENT)
Age: 53
End: 2018-04-11

## 2018-04-11 ENCOUNTER — APPOINTMENT (OUTPATIENT)
Dept: CARDIOLOGY | Facility: CLINIC | Age: 53
End: 2018-04-11
Payer: COMMERCIAL

## 2018-04-11 VITALS
WEIGHT: 177 LBS | DIASTOLIC BLOOD PRESSURE: 76 MMHG | SYSTOLIC BLOOD PRESSURE: 105 MMHG | OXYGEN SATURATION: 96 % | HEART RATE: 73 BPM | HEIGHT: 69 IN | BODY MASS INDEX: 26.22 KG/M2

## 2018-04-11 PROCEDURE — 99215 OFFICE O/P EST HI 40 MIN: CPT

## 2018-04-11 PROCEDURE — 93000 ELECTROCARDIOGRAM COMPLETE: CPT

## 2018-07-11 ENCOUNTER — APPOINTMENT (OUTPATIENT)
Dept: CARDIOLOGY | Facility: CLINIC | Age: 53
End: 2018-07-11

## 2018-08-09 ENCOUNTER — NON-APPOINTMENT (OUTPATIENT)
Age: 53
End: 2018-08-09

## 2018-10-17 ENCOUNTER — APPOINTMENT (OUTPATIENT)
Dept: CARDIOLOGY | Facility: CLINIC | Age: 53
End: 2018-10-17

## 2018-10-17 ENCOUNTER — OUTPATIENT (OUTPATIENT)
Dept: OUTPATIENT SERVICES | Facility: HOSPITAL | Age: 53
LOS: 1 days | Discharge: HOME | End: 2018-10-17
Payer: COMMERCIAL

## 2018-10-17 DIAGNOSIS — Z95.5 PRESENCE OF CORONARY ANGIOPLASTY IMPLANT AND GRAFT: Chronic | ICD-10-CM

## 2018-10-17 DIAGNOSIS — R94.39 ABNORMAL RESULT OF OTHER CARDIOVASCULAR FUNCTION STUDY: ICD-10-CM

## 2018-10-17 LAB
BUN SERPL-MCNC: 12 MG/DL — SIGNIFICANT CHANGE UP (ref 7–23)
CALCIUM SERPL-MCNC: 9.2 MG/DL — SIGNIFICANT CHANGE UP (ref 8.4–10.5)
CHLORIDE SERPL-SCNC: 102 MMOL/L — SIGNIFICANT CHANGE UP (ref 98–107)
CO2 SERPL-SCNC: 23 MMOL/L — SIGNIFICANT CHANGE UP (ref 22–31)
CREAT SERPL-MCNC: 0.99 MG/DL — SIGNIFICANT CHANGE UP (ref 0.5–1.3)
GLUCOSE BLDC GLUCOMTR-MCNC: 164 MG/DL — HIGH (ref 70–99)
GLUCOSE SERPL-MCNC: 171 MG/DL — HIGH (ref 70–99)
HBA1C BLD-MCNC: 6.9 % — HIGH (ref 4–5.6)
HCT VFR BLD CALC: 41.6 % — SIGNIFICANT CHANGE UP (ref 39–50)
HGB BLD-MCNC: 14.4 G/DL — SIGNIFICANT CHANGE UP (ref 13–17)
MCHC RBC-ENTMCNC: 30.4 PG — SIGNIFICANT CHANGE UP (ref 27–34)
MCHC RBC-ENTMCNC: 34.6 % — SIGNIFICANT CHANGE UP (ref 32–36)
MCV RBC AUTO: 87.9 FL — SIGNIFICANT CHANGE UP (ref 80–100)
NRBC # FLD: 0 — SIGNIFICANT CHANGE UP
PLATELET # BLD AUTO: 227 K/UL — SIGNIFICANT CHANGE UP (ref 150–400)
PMV BLD: 11.4 FL — SIGNIFICANT CHANGE UP (ref 7–13)
POTASSIUM SERPL-MCNC: 4.1 MMOL/L — SIGNIFICANT CHANGE UP (ref 3.5–5.3)
POTASSIUM SERPL-SCNC: 4.1 MMOL/L — SIGNIFICANT CHANGE UP (ref 3.5–5.3)
RBC # BLD: 4.73 M/UL — SIGNIFICANT CHANGE UP (ref 4.2–5.8)
RBC # FLD: 12.3 % — SIGNIFICANT CHANGE UP (ref 10.3–14.5)
SODIUM SERPL-SCNC: 140 MMOL/L — SIGNIFICANT CHANGE UP (ref 135–145)
WBC # BLD: 7.63 K/UL — SIGNIFICANT CHANGE UP (ref 3.8–10.5)
WBC # FLD AUTO: 7.63 K/UL — SIGNIFICANT CHANGE UP (ref 3.8–10.5)

## 2018-10-17 PROCEDURE — 93571 IV DOP VEL&/PRESS C FLO 1ST: CPT | Mod: 26,LD

## 2018-10-17 PROCEDURE — 93458 L HRT ARTERY/VENTRICLE ANGIO: CPT | Mod: 26

## 2018-10-17 PROCEDURE — 99152 MOD SED SAME PHYS/QHP 5/>YRS: CPT

## 2018-10-17 PROCEDURE — 93010 ELECTROCARDIOGRAM REPORT: CPT

## 2018-10-17 RX ORDER — DEXTROSE 50 % IN WATER 50 %
25 SYRINGE (ML) INTRAVENOUS ONCE
Qty: 0 | Refills: 0 | Status: DISCONTINUED | OUTPATIENT
Start: 2018-10-17 | End: 2018-11-01

## 2018-10-17 RX ORDER — GLUCAGON INJECTION, SOLUTION 0.5 MG/.1ML
1 INJECTION, SOLUTION SUBCUTANEOUS ONCE
Qty: 0 | Refills: 0 | Status: DISCONTINUED | OUTPATIENT
Start: 2018-10-17 | End: 2018-11-01

## 2018-10-17 RX ORDER — INSULIN LISPRO 100/ML
VIAL (ML) SUBCUTANEOUS
Qty: 0 | Refills: 0 | Status: DISCONTINUED | OUTPATIENT
Start: 2018-10-17 | End: 2018-11-01

## 2018-10-17 RX ORDER — DEXTROSE 50 % IN WATER 50 %
15 SYRINGE (ML) INTRAVENOUS ONCE
Qty: 0 | Refills: 0 | Status: DISCONTINUED | OUTPATIENT
Start: 2018-10-17 | End: 2018-11-01

## 2018-10-17 RX ORDER — SODIUM CHLORIDE 9 MG/ML
1000 INJECTION, SOLUTION INTRAVENOUS
Qty: 0 | Refills: 0 | Status: DISCONTINUED | OUTPATIENT
Start: 2018-10-17 | End: 2018-11-01

## 2018-10-17 RX ORDER — METFORMIN HYDROCHLORIDE 850 MG/1
1 TABLET ORAL
Qty: 0 | Refills: 0 | COMMUNITY

## 2018-10-17 RX ORDER — SODIUM CHLORIDE 9 MG/ML
3 INJECTION INTRAMUSCULAR; INTRAVENOUS; SUBCUTANEOUS EVERY 8 HOURS
Qty: 0 | Refills: 0 | Status: DISCONTINUED | OUTPATIENT
Start: 2018-10-17 | End: 2018-11-01

## 2018-10-17 RX ORDER — ASPIRIN/CALCIUM CARB/MAGNESIUM 324 MG
81 TABLET ORAL ONCE
Qty: 0 | Refills: 0 | Status: COMPLETED | OUTPATIENT
Start: 2018-10-17 | End: 2018-10-17

## 2018-10-17 RX ORDER — DEXTROSE 50 % IN WATER 50 %
12.5 SYRINGE (ML) INTRAVENOUS ONCE
Qty: 0 | Refills: 0 | Status: DISCONTINUED | OUTPATIENT
Start: 2018-10-17 | End: 2018-11-01

## 2018-10-17 RX ORDER — TICAGRELOR 90 MG/1
90 TABLET ORAL ONCE
Qty: 0 | Refills: 0 | Status: COMPLETED | OUTPATIENT
Start: 2018-10-17 | End: 2018-10-17

## 2018-10-17 RX ADMIN — Medication 81 MILLIGRAM(S): at 10:38

## 2018-10-17 RX ADMIN — TICAGRELOR 90 MILLIGRAM(S): 90 TABLET ORAL at 10:39

## 2018-10-17 NOTE — H&P CARDIOLOGY - ATTENDING COMMENTS
60 year old man with CAD s/p PCI in 3/2018 to diagonal, HTN, and uncontrolled NIDDM presents for elective cath for + stress test.    #CAD-  Patient s/p LHC on 10/17/2018 revealing lesion in proximal D1.  Lesion found to not be hemodynamically significant by IFR.  No intervention performed.  Stress test likely false +.  Continue current cardiac management.

## 2018-10-17 NOTE — H&P CARDIOLOGY - HISTORY OF PRESENT ILLNESS
52 y/o M w/ PMH of CAD s/p 1 JORDAN(placed 3/2018) and DM-II presents for cardiac catheretization. Pt had a stress test showing mild anteroseptal and mild apical perfusion defects with normal LV size and wall motion w/ EF 59%. These finding are new compared to the previous stress report in 8/2013. Pt had  a stent placed to D1 in 3/2018 and states that he has been compliant with all medications prescribed. Pt denies N/V/D, fevers, chills, cough, 52 y/o M w/ PMH of CAD s/p 1 JORDAN(placed 3/2018) and DM-II presents for cardiac catheretization. Pt states that he has been asymptomatic but had a routine stress test recently. Pt's stress test showed mild anteroseptal and mild apical perfusion defects with normal LV size and wall motion w/ EF 59%. These finding are new compared to the previous stress report in 8/2013. Pt had a stent placed to D1 in 3/2018 for NSTEMI and states that he has been compliant with all medications prescribed. Pt denies N/V/D, fevers, chills, cough, palpitations, chest pain, syncope, dyspnea on exertion, orthopnea, nocturnal paroxysmal dyspnea, edema, cyanosis, hypertension, heart murmurs, varicosities, phlebitis, claudication.

## 2018-10-17 NOTE — H&P CARDIOLOGY - RS GEN PE MLT RESP DETAILS PC
respirations non-labored/no chest wall tenderness/good air movement/breath sounds equal/clear to auscultation bilaterally/airway patent

## 2021-02-16 ENCOUNTER — APPOINTMENT (OUTPATIENT)
Dept: UROLOGY | Facility: CLINIC | Age: 56
End: 2021-02-16
Payer: COMMERCIAL

## 2021-02-16 ENCOUNTER — TRANSCRIPTION ENCOUNTER (OUTPATIENT)
Age: 56
End: 2021-02-16

## 2021-02-16 VITALS
DIASTOLIC BLOOD PRESSURE: 80 MMHG | HEIGHT: 69 IN | TEMPERATURE: 98.7 F | BODY MASS INDEX: 25.18 KG/M2 | WEIGHT: 170 LBS | SYSTOLIC BLOOD PRESSURE: 110 MMHG

## 2021-02-16 PROBLEM — I25.10 ATHEROSCLEROTIC HEART DISEASE OF NATIVE CORONARY ARTERY WITHOUT ANGINA PECTORIS: Chronic | Status: ACTIVE | Noted: 2018-10-17

## 2021-02-16 PROCEDURE — 99072 ADDL SUPL MATRL&STAF TM PHE: CPT

## 2021-02-16 PROCEDURE — 99204 OFFICE O/P NEW MOD 45 MIN: CPT

## 2021-02-16 RX ORDER — EMPAGLIFLOZIN 25 MG/1
25 TABLET, FILM COATED ORAL
Refills: 0 | Status: ACTIVE | COMMUNITY

## 2021-02-16 NOTE — PHYSICAL EXAM
[General Appearance - Well Developed] : well developed [General Appearance - Well Nourished] : well nourished [Normal Appearance] : normal appearance [Well Groomed] : well groomed [General Appearance - In No Acute Distress] : no acute distress [Edema] : no peripheral edema [Respiration, Rhythm And Depth] : normal respiratory rhythm and effort [Exaggerated Use Of Accessory Muscles For Inspiration] : no accessory muscle use [Abdomen Soft] : soft [Abdomen Tenderness] : non-tender [Costovertebral Angle Tenderness] : no ~M costovertebral angle tenderness [Urethral Meatus] : meatus normal [Urinary Bladder Findings] : the bladder was normal on palpation [Scrotum] : the scrotum was normal [Testes Mass (___cm)] : there were no testicular masses [No Prostate Nodules] : no prostate nodules [Normal Station and Gait] : the gait and station were normal for the patient's age [No Focal Deficits] : no focal deficits [] : no rash [Oriented To Time, Place, And Person] : oriented to person, place, and time [Affect] : the affect was normal [Mood] : the mood was normal [Not Anxious] : not anxious [No Palpable Adenopathy] : no palpable adenopathy

## 2021-02-16 NOTE — ASSESSMENT
[FreeTextEntry1] : reviewed guidelines for asymptomatic microhem \par unclear as ua neg x2 but one has 7 rbc \par will get renal sono \par hold off cysto for now

## 2021-02-24 ENCOUNTER — APPOINTMENT (OUTPATIENT)
Dept: UROLOGY | Facility: CLINIC | Age: 56
End: 2021-02-24
Payer: COMMERCIAL

## 2021-02-24 DIAGNOSIS — R31.29 OTHER MICROSCOPIC HEMATURIA: ICD-10-CM

## 2021-02-24 PROCEDURE — 99072 ADDL SUPL MATRL&STAF TM PHE: CPT

## 2021-02-24 PROCEDURE — 76775 US EXAM ABDO BACK WALL LIM: CPT

## 2021-03-09 PROBLEM — R31.29 MICROHEMATURIA: Status: ACTIVE | Noted: 2021-02-16

## 2024-08-10 NOTE — ED ADULT TRIAGE NOTE - ARRIVAL FROM
Patient verbally informed that body search would be performed to  remove any items that may be potentially used for self harm or suicidal behavior and advised about what would occur during the search process.  Patient denies being in possession of potentially dangerous items.  The patient was provided with an opportunity to ask questions or voice any concerns related to the body surface search prior to it being performed.  The patient agreed to participate in the search process.  Body surface search was conducted by two staff members: (JAI Moy & MATT Bartlett). Metal detector wand used during body and belonging search, contraband was not found.  Patient response to search process was Cooperative with no adverse physical or psychological response.  Contraband was not found during the search process.    08/10/24 0100   Patient Belongings   Valuables Policy Informed   Belongings Retained by Patient at Bedside Clothing   Clothing Pants;Shirt;Footwear;Bra;Socks;Underpants   Belongings Sent to Loss Prevention/Public Safety (!) Money   Belongings Retained/Secured in Department Designated Storage Area Electronic equipment;Cell phone  (backpack, 7 credit/debit cards, 2 insurance cards, purse, 9 hygiene products, power bank, contacts)     
Home
